# Patient Record
Sex: MALE | NOT HISPANIC OR LATINO | Employment: OTHER | URBAN - METROPOLITAN AREA
[De-identification: names, ages, dates, MRNs, and addresses within clinical notes are randomized per-mention and may not be internally consistent; named-entity substitution may affect disease eponyms.]

---

## 2023-07-31 ENCOUNTER — OFFICE VISIT (OUTPATIENT)
Dept: OPHTHALMOLOGY | Facility: CLINIC | Age: 73
End: 2023-07-31
Payer: COMMERCIAL

## 2023-07-31 DIAGNOSIS — E11.42 TYPE 2 DIABETES MELLITUS WITH DIABETIC POLYNEUROPATHY, WITH LONG-TERM CURRENT USE OF INSULIN (H): Primary | ICD-10-CM

## 2023-07-31 DIAGNOSIS — E11.3299 BACKGROUND DIABETIC RETINOPATHY (H): ICD-10-CM

## 2023-07-31 DIAGNOSIS — H52.4 PRESBYOPIA: ICD-10-CM

## 2023-07-31 DIAGNOSIS — Z79.4 TYPE 2 DIABETES MELLITUS WITH DIABETIC POLYNEUROPATHY, WITH LONG-TERM CURRENT USE OF INSULIN (H): Primary | ICD-10-CM

## 2023-07-31 DIAGNOSIS — H43.813 POSTERIOR VITREOUS DETACHMENT OF BOTH EYES: ICD-10-CM

## 2023-07-31 DIAGNOSIS — Z96.1 PSEUDOPHAKIA OF BOTH EYES: ICD-10-CM

## 2023-07-31 DIAGNOSIS — Z01.01 ENCOUNTER FOR EXAMINATION OF EYES AND VISION WITH ABNORMAL FINDINGS: ICD-10-CM

## 2023-07-31 DIAGNOSIS — H35.81 MACULAR EDEMA: ICD-10-CM

## 2023-07-31 DIAGNOSIS — H40.003 GLAUCOMA SUSPECT OF BOTH EYES: ICD-10-CM

## 2023-07-31 PROBLEM — R39.9 LOWER URINARY TRACT SYMPTOMS: Status: ACTIVE | Noted: 2018-03-26

## 2023-07-31 PROBLEM — S42.102A CLOSED FRACTURE OF LEFT SCAPULA: Status: ACTIVE | Noted: 2023-07-31

## 2023-07-31 PROBLEM — D62 ACUTE BLOOD LOSS ANEMIA: Status: ACTIVE | Noted: 2019-03-09

## 2023-07-31 PROBLEM — N18.30 CKD (CHRONIC KIDNEY DISEASE) STAGE 3, GFR 30-59 ML/MIN (H): Status: ACTIVE | Noted: 2018-01-08

## 2023-07-31 PROBLEM — Z86.0100 HISTORY OF COLON POLYPS: Status: ACTIVE | Noted: 2020-01-23

## 2023-07-31 PROBLEM — I10 ESSENTIAL HYPERTENSION: Status: ACTIVE | Noted: 2023-07-31

## 2023-07-31 PROBLEM — I73.9 VASCULAR CLAUDICATION (H): Status: ACTIVE | Noted: 2023-07-31

## 2023-07-31 PROBLEM — S82.61XA CLOSED DISPLACED FRACTURE OF LATERAL MALLEOLUS OF RIGHT FIBULA: Status: ACTIVE | Noted: 2017-09-01

## 2023-07-31 PROBLEM — Z91.018 ALLERGY TO BANANA: Status: ACTIVE | Noted: 2017-08-28

## 2023-07-31 PROCEDURE — 92015 DETERMINE REFRACTIVE STATE: CPT | Performed by: OPHTHALMOLOGY

## 2023-07-31 PROCEDURE — 92004 COMPRE OPH EXAM NEW PT 1/>: CPT | Performed by: OPHTHALMOLOGY

## 2023-07-31 PROCEDURE — 92134 CPTRZ OPH DX IMG PST SGM RTA: CPT | Performed by: OPHTHALMOLOGY

## 2023-07-31 RX ORDER — TAMSULOSIN HYDROCHLORIDE 0.4 MG/1
0.4 CAPSULE ORAL
COMMUNITY
Start: 2023-01-06

## 2023-07-31 RX ORDER — METOPROLOL SUCCINATE 25 MG/1
1 TABLET, EXTENDED RELEASE ORAL DAILY
COMMUNITY
Start: 2023-01-06

## 2023-07-31 RX ORDER — LISINOPRIL 10 MG/1
1 TABLET ORAL DAILY
COMMUNITY
Start: 2023-01-06

## 2023-07-31 RX ORDER — TADALAFIL 5 MG/1
TABLET ORAL
COMMUNITY
Start: 2023-06-28

## 2023-07-31 RX ORDER — ATORVASTATIN CALCIUM 40 MG/1
1 TABLET, FILM COATED ORAL DAILY
COMMUNITY
Start: 2023-01-06

## 2023-07-31 RX ORDER — TRAZODONE HYDROCHLORIDE 50 MG/1
50 TABLET, FILM COATED ORAL
COMMUNITY
Start: 2023-07-16

## 2023-07-31 RX ORDER — L. ACIDOPHILUS/BIFIDO. LONGUM 15 MG
CAPSULE,DELAYED RELEASE (ENTERIC COATED) ORAL
COMMUNITY
Start: 2022-02-22

## 2023-07-31 RX ORDER — DULOXETIN HYDROCHLORIDE 60 MG/1
60 CAPSULE, DELAYED RELEASE ORAL DAILY
COMMUNITY
Start: 2023-01-06

## 2023-07-31 RX ORDER — PREGABALIN 150 MG/1
CAPSULE ORAL
COMMUNITY
Start: 2023-05-18

## 2023-07-31 RX ORDER — DULAGLUTIDE 1.5 MG/.5ML
INJECTION, SOLUTION SUBCUTANEOUS
COMMUNITY
Start: 2023-03-10

## 2023-07-31 ASSESSMENT — EXTERNAL EXAM - LEFT EYE: OS_EXAM: PROLAPSED FAT PADS: UPPER, LOWER

## 2023-07-31 ASSESSMENT — CONF VISUAL FIELD
OD_INFERIOR_NASAL_RESTRICTION: 2
OD_SUPERIOR_NASAL_RESTRICTION: 2
OS_NORMAL: 1
OS_SUPERIOR_NASAL_RESTRICTION: 0
OD_SUPERIOR_TEMPORAL_RESTRICTION: 2
OS_INFERIOR_TEMPORAL_RESTRICTION: 0
OS_INFERIOR_NASAL_RESTRICTION: 0
OS_SUPERIOR_TEMPORAL_RESTRICTION: 0
OD_INFERIOR_TEMPORAL_RESTRICTION: 2

## 2023-07-31 ASSESSMENT — SLIT LAMP EXAM - LIDS
COMMENTS: 2+ SCLERAL SHOW
COMMENTS: 2+ SCLERAL SHOW

## 2023-07-31 ASSESSMENT — TONOMETRY
IOP_METHOD: APPLANATION
OD_IOP_MMHG: 11
OS_IOP_MMHG: 11

## 2023-07-31 ASSESSMENT — REFRACTION_MANIFEST
OS_ADD: +2.50
OS_SPHERE: -0.25
OD_SPHERE: -0.50
OD_CYLINDER: +0.50
OS_CYLINDER: SPHERE
OD_ADD: +2.50
OD_AXIS: 010

## 2023-07-31 ASSESSMENT — VISUAL ACUITY
OS_SC: 20/25
METHOD: SNELLEN - LINEAR
OD_SC: 20/25 SLOW
OD_SC+: -3

## 2023-07-31 ASSESSMENT — REFRACTION_WEARINGRX
OD_ADD: +1.75
OD_CYLINDER: SPHERE
OS_ADD: +1.75
OS_CYLINDER: SPHERE
SPECS_TYPE: PAL
OS_SPHERE: PLANO
OD_SPHERE: PLANO

## 2023-07-31 ASSESSMENT — CUP TO DISC RATIO
OD_RATIO: 0.5
OS_RATIO: 0.2

## 2023-07-31 ASSESSMENT — EXTERNAL EXAM - RIGHT EYE: OD_EXAM: PROLAPSED FAT PADS: UPPER, LOWER

## 2023-07-31 NOTE — PROGRESS NOTES
" Current Eye Medications:  none     Subjective:  here for complete eye exam today. Had a shot in the right eye once, unsure why.  Diabetic, last A1c was 11.3, up from 8.4. father  and had a lot of stress. Fell off routine of eating. OCT today due to central vision right eye blurred      Objective:  See Ophthalmology Exam.       Assessment:  Baseline eye exam in patient with diabetes.  Mild background diabetic retinopathy both eyes and mild macular cystic changes right eye on retinal OCT.      ICD-10-CM    1. Type 2 diabetes mellitus with diabetic polyneuropathy, with long-term current use of insulin (H)  E11.42     Z79.4       2. Background diabetic retinopathy, mild, ou  E11.3299       3. Macular edema, mild, od  H35.81       4. Pseudophakia of both eyes  Z96.1       5. Glaucoma suspect of both eyes  H40.003       6. Posterior vitreous detachment of both eyes  H43.813       7. Encounter for examination of eyes and vision with abnormal findings  Z01.01       8. Presbyopia  H52.4            Plan:  Glasses prescription given - optional  May use artificial tears up to four times a day (like Refresh Optive, Systane Balance, TheraTears, or generic artificial tears are ok. Avoid \"get the red out\" drops).   Possible clouding of posterior capsule both eyes discussed.   Return visit 6 months for an intraocular pressure check, pachy, glaucoma OCT, retinal OCT, and Nelson Visual Field.     Dr. Nazario [phone]    "

## 2023-07-31 NOTE — LETTER
"    2023         RE: Raciel Santos  [address] [place] [address]        Dear Colleague,    Thank you for referring your patient, Raciel Santos, to the [facility]. Please see a copy of my visit note below.     Current Eye Medications:  none     Subjective:  here for complete eye exam today. Had a shot in the right eye once, unsure why.  Diabetic, last A1c was 11.3, up from 8.4. father  and had a lot of stress. Fell off routine of eating. OCT today due to central vision right eye blurred      Objective:  See Ophthalmology Exam.       Assessment:  Baseline eye exam in patient with diabetes.  Mild background diabetic retinopathy both eyes and mild macular cystic changes right eye on retinal OCT.      ICD-10-CM    1. Type 2 diabetes mellitus with diabetic polyneuropathy, with long-term current use of insulin (H)  E11.42     Z79.4       2. Background diabetic retinopathy, mild, ou  E11.3299       3. Macular edema, mild, od  H35.81       4. Pseudophakia of both eyes  Z96.1       5. Glaucoma suspect of both eyes  H40.003       6. Posterior vitreous detachment of both eyes  H43.813       7. Encounter for examination of eyes and vision with abnormal findings  Z01.01       8. Presbyopia  H52.4            Plan:  Glasses prescription given - optional  May use artificial tears up to four times a day (like Refresh Optive, Systane Balance, TheraTears, or generic artificial tears are ok. Avoid \"get the red out\" drops).   Possible clouding of posterior capsule both eyes discussed.   Return visit 6 months for an intraocular pressure check, pachy, glaucoma OCT, retinal OCT, and Nelson Visual Field.     Dr. Nazario [phone]        Again, thank you for allowing me to participate in the care of your patient.        Sincerely,        Bhavik Nazario MD  "

## 2023-07-31 NOTE — PATIENT INSTRUCTIONS
"Glasses prescription given - optional  May use artificial tears up to four times a day (like Refresh Optive, Systane Balance, TheraTears, or generic artificial tears are ok. Avoid \"get the red out\" drops).   Possible clouding of posterior capsule both eyes discussed.   Return visit 6 months for an intraocular pressure check, pachy, glaucoma OCT, retinal OCT, and Nelson Visual Field.     Dr. Nazario [phone]      Patient Education   Diabetes weakens the blood vessels all over the body, including the eyes. Damage to the blood vessels in the eyes can cause swelling or bleeding into part of the eye (called the retina). This is called diabetic retinopathy (CHIARA-[id number]-AH-puh-thee). If not treated, this disease can cause vision loss or blindness.   Symptoms may include blurred or distorted vision, but many people have no symptoms. It's important to see your eye doctor regularly to check for problems.   Early treatment and good control can help protect your vision. Here are the things you can do to help prevent vision loss:      1. Keep your blood sugar levels under tight control.      2. Bring high blood pressure under control.      3. No smoking.      4. Have yearly dilated eye exams.     "

## 2023-08-26 PROBLEM — Z96.1 PSEUDOPHAKIA OF BOTH EYES: Status: ACTIVE | Noted: 2023-08-26

## 2023-08-26 PROBLEM — H35.81 MACULAR EDEMA: Status: ACTIVE | Noted: 2023-08-26

## 2023-08-26 PROBLEM — E11.3299 BACKGROUND DIABETIC RETINOPATHY (H): Status: ACTIVE | Noted: 2023-08-26
